# Patient Record
Sex: MALE | Race: BLACK OR AFRICAN AMERICAN
[De-identification: names, ages, dates, MRNs, and addresses within clinical notes are randomized per-mention and may not be internally consistent; named-entity substitution may affect disease eponyms.]

---

## 2019-11-22 ENCOUNTER — HOSPITAL ENCOUNTER (EMERGENCY)
Dept: HOSPITAL 4 - SED | Age: 19
Discharge: HOME | End: 2019-11-22
Payer: COMMERCIAL

## 2019-11-22 VITALS — WEIGHT: 210 LBS | SYSTOLIC BLOOD PRESSURE: 111 MMHG | BODY MASS INDEX: 25.57 KG/M2 | HEIGHT: 76 IN

## 2019-11-22 VITALS — SYSTOLIC BLOOD PRESSURE: 111 MMHG

## 2019-11-22 DIAGNOSIS — H00.014: Primary | ICD-10-CM

## 2019-11-22 NOTE — NUR
patient bib mother cc of stye in the eye with discharges. denies pain. vital 
sign stable, afebrile.no other concerned noted.

## 2019-11-22 NOTE — NUR
Patient given written and verbal discharge instructions and verbalizes 
understanding.  ER MD discussed with patient the results and treatment 
provided. Patient in stable condition. ID arm band removed. 

Rx of sulfacetamide ophthalmic ointment given. Patient educated on pain 
management and to follow up with PMD. Pain Scale 0.

Opportunity for questions provided and answered. Medication side effect fact 
sheet provided.

## 2021-07-05 ENCOUNTER — HOSPITAL ENCOUNTER (EMERGENCY)
Dept: HOSPITAL 4 - SED | Age: 21
Discharge: HOME | End: 2021-07-05
Payer: COMMERCIAL

## 2021-07-05 VITALS — BODY MASS INDEX: 25.82 KG/M2 | WEIGHT: 212 LBS | HEIGHT: 76 IN

## 2021-07-05 VITALS — SYSTOLIC BLOOD PRESSURE: 136 MMHG

## 2021-07-05 VITALS — SYSTOLIC BLOOD PRESSURE: 102 MMHG
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
## 2021-07-05 DIAGNOSIS — Z79.899: ICD-10-CM

## 2021-07-05 DIAGNOSIS — S93.502A: Primary | ICD-10-CM
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
## 2021-08-10 ENCOUNTER — HOSPITAL ENCOUNTER (EMERGENCY)
Dept: HOSPITAL 4 - SED | Age: 21
Discharge: HOME | End: 2021-08-10
Payer: COMMERCIAL

## 2021-08-10 VITALS — SYSTOLIC BLOOD PRESSURE: 105 MMHG

## 2021-08-10 VITALS — HEIGHT: 76 IN | WEIGHT: 205 LBS | BODY MASS INDEX: 24.96 KG/M2
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
## 2021-08-10 DIAGNOSIS — S61.001A: Primary | ICD-10-CM
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
## 2021-08-10 DIAGNOSIS — Z79.899: ICD-10-CM
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
